# Patient Record
Sex: FEMALE | Race: WHITE | URBAN - METROPOLITAN AREA
[De-identification: names, ages, dates, MRNs, and addresses within clinical notes are randomized per-mention and may not be internally consistent; named-entity substitution may affect disease eponyms.]

---

## 2022-05-09 ENCOUNTER — HOSPITAL ENCOUNTER (OUTPATIENT)
Age: 22
Setting detail: SPECIMEN
Discharge: HOME | End: 2022-05-09

## 2022-05-09 ENCOUNTER — OFFICE VISIT (OUTPATIENT)
Dept: OBGYN CLINIC | Age: 22
End: 2022-05-09
Payer: COMMERCIAL

## 2022-05-09 VITALS
WEIGHT: 164 LBS | SYSTOLIC BLOOD PRESSURE: 104 MMHG | BODY MASS INDEX: 24.86 KG/M2 | DIASTOLIC BLOOD PRESSURE: 69 MMHG | HEIGHT: 68 IN

## 2022-05-09 DIAGNOSIS — N94.10 DYSPAREUNIA IN FEMALE: ICD-10-CM

## 2022-05-09 DIAGNOSIS — N94.10 DYSPAREUNIA IN FEMALE: Primary | ICD-10-CM

## 2022-05-09 DIAGNOSIS — Z30.431 ENCOUNTER FOR ROUTINE CHECKING OF INTRAUTERINE CONTRACEPTIVE DEVICE (IUD): ICD-10-CM

## 2022-05-09 PROCEDURE — 99213 OFFICE O/P EST LOW 20 MIN: CPT | Performed by: NURSE PRACTITIONER

## 2022-05-09 RX ORDER — BUPROPION HYDROCHLORIDE 150 MG/1
150 TABLET ORAL PRN
COMMUNITY
Start: 2022-03-08

## 2022-05-09 RX ORDER — PROPRANOLOL HYDROCHLORIDE 10 MG/1
TABLET ORAL
COMMUNITY
Start: 2021-03-08

## 2022-05-09 RX ORDER — FLUCONAZOLE 150 MG/1
TABLET ORAL
COMMUNITY
Start: 2022-05-06 | End: 2022-05-25 | Stop reason: ALTCHOICE

## 2022-05-09 NOTE — PROGRESS NOTES
PROBLEM VISIT     Date of service: 2022    Nima Storm  Is a 24 y.o. female    PT's PCP is: No primary care provider on file. : 2000                                             Subjective:       No LMP recorded. (Menstrual status: Other - See Notes). OB History    Para Term  AB Living   0 0 0 0 0 0   SAB IAB Ectopic Molar Multiple Live Births   0 0 0 0 0 0        Social History     Tobacco Use   Smoking Status Current Every Day Smoker   Smokeless Tobacco Never Used        Social History     Substance and Sexual Activity   Alcohol Use Yes       Allergies: Cephalexin and Doxycycline      Current Outpatient Medications:     propranolol (INDERAL) 10 MG tablet, TAKE 1 TABLET BY MOUTH TWICE DAILY AS NEEDED FOR ANXIETY, Disp: , Rfl:     buPROPion (WELLBUTRIN XL) 150 MG extended release tablet, Take 150 mg by mouth as needed, Disp: , Rfl:     Levonorgestrel (KYLEENA) IUD 19.5 mg, 1 each by IntraUTERine route once, Disp: , Rfl:     fluconazole (DIFLUCAN) 150 MG tablet, TAKE 1 TABLET BY MOUTH 1 TIME FOR 1 DOSE. MAY REPEAT DOSE IN 72 HOURS AS NEEDED, Disp: , Rfl:     Social History     Substance and Sexual Activity   Sexual Activity Yes    Partners: Male    Birth control/protection: I.U.D. Gregg Magaña- 19     Chief Complaint   Patient presents with    Dyspareunia     C/O dyspareunia x 3-4 wks, mostly right sided pain. PE:  Vital Signs  Blood pressure 104/69, height 5' 8\" (1.727 m), weight 164 lb (74.4 kg). HPI: Patient presents today with complaints of pelvic pain R>L onset by intercourse; lingers for 15 minutes following SI. Describes pain as cramping and sharp. denies any pelvic pain outside of intercourse. Denies any change is partners. Denies any s/s of vaginal infection. Reports negative STD screening recently and declines repeat today. PT denies fever, chills, nausea and vomiting       Review of Systems   Constitutional: Negative. Gastrointestinal: Negative. Genitourinary: Positive for dyspareunia. Negative for dysuria, menstrual problem, pelvic pain, vaginal bleeding and vaginal discharge. Physical Exam  Constitutional:       Appearance: Normal appearance. HENT:      Head: Normocephalic. Pulmonary:      Effort: Pulmonary effort is normal.   Genitourinary:     General: Normal vulva. Vagina: No vaginal discharge, erythema or bleeding. Cervix: Normal.      Uterus: Not tender. Adnexa:         Right: No tenderness. Left: No tenderness. Comments: IUD strings in cervical os  Musculoskeletal:         General: Normal range of motion. Neurological:      General: No focal deficit present. Mental Status: She is alert. Psychiatric:         Mood and Affect: Mood normal.         Behavior: Behavior normal.         Chaperone: not present     Assessment and Plan          Diagnosis Orders   1. Dyspareunia in female  Vaginitis DNA Probe   2. Encounter for routine checking of intrauterine contraceptive device (IUD)               I am having Jarred Palmer maintain her propranolol, Levonorgestrel, fluconazole, and buPROPion. Return in about 1 week (around 5/16/2022) for gyn US. There are no Patient Instructions on file for this visit.     Time spent 20 minutes      GISELLE Crawford NP,5/15/2022 6:55 PM

## 2022-05-10 LAB
CANDIDA SPECIES, DNA PROBE: NEGATIVE
GARDNERELLA VAGINALIS, DNA PROBE: NEGATIVE
SOURCE: NORMAL
TRICHOMONAS VAGINALIS DNA: NEGATIVE

## 2022-05-15 ASSESSMENT — ENCOUNTER SYMPTOMS: GASTROINTESTINAL NEGATIVE: 1

## 2022-05-25 ENCOUNTER — HOSPITAL ENCOUNTER (OUTPATIENT)
Age: 22
Setting detail: SPECIMEN
Discharge: HOME | End: 2022-05-25

## 2022-05-25 ENCOUNTER — OFFICE VISIT (OUTPATIENT)
Dept: OBGYN CLINIC | Age: 22
End: 2022-05-25
Payer: COMMERCIAL

## 2022-05-25 VITALS — SYSTOLIC BLOOD PRESSURE: 119 MMHG | DIASTOLIC BLOOD PRESSURE: 79 MMHG | BODY MASS INDEX: 24.78 KG/M2 | WEIGHT: 163 LBS

## 2022-05-25 DIAGNOSIS — N89.8 VAGINAL DRYNESS: ICD-10-CM

## 2022-05-25 DIAGNOSIS — N94.10 DYSPAREUNIA, FEMALE: Primary | ICD-10-CM

## 2022-05-25 DIAGNOSIS — Z30.431 ENCOUNTER FOR ROUTINE CHECKING OF INTRAUTERINE CONTRACEPTIVE DEVICE (IUD): ICD-10-CM

## 2022-05-25 LAB
ESTRADIOL LEVEL: 47.6 PG/ML (ref 27–314)
TSH SERPL DL<=0.05 MIU/L-ACNC: 0.91 UIU/ML (ref 0.3–5)

## 2022-05-25 PROCEDURE — 99213 OFFICE O/P EST LOW 20 MIN: CPT | Performed by: NURSE PRACTITIONER

## 2022-05-25 RX ORDER — CIPROFLOXACIN AND DEXAMETHASONE 3; 1 MG/ML; MG/ML
4 SUSPENSION/ DROPS AURICULAR (OTIC) 2 TIMES DAILY
COMMUNITY
Start: 2022-05-16

## 2022-05-25 ASSESSMENT — ENCOUNTER SYMPTOMS: GASTROINTESTINAL NEGATIVE: 1

## 2022-05-25 NOTE — PROGRESS NOTES
PROBLEM VISIT     Date of service: 2022    Irwin Fleming  Is a 24 y.o. female    PT's PCP is: No primary care provider on file. : 2000                                             Subjective:       Patient's last menstrual period was 2022. OB History    Para Term  AB Living   0 0 0 0 0 0   SAB IAB Ectopic Molar Multiple Live Births   0 0 0 0 0 0        Social History     Tobacco Use   Smoking Status Current Every Day Smoker   Smokeless Tobacco Never Used        Social History     Substance and Sexual Activity   Alcohol Use Yes       Allergies: Cephalexin and Doxycycline      Current Outpatient Medications:     ciprofloxacin-dexamethasone (CIPRODEX) 0.3-0.1 % otic suspension, Place 4 drops in ear(s) 2 times daily, Disp: , Rfl:     propranolol (INDERAL) 10 MG tablet, TAKE 1 TABLET BY MOUTH TWICE DAILY AS NEEDED FOR ANXIETY, Disp: , Rfl:     Levonorgestrel (KYLEENA) IUD 19.5 mg, 1 each by IntraUTERine route once, Disp: , Rfl:     buPROPion (WELLBUTRIN XL) 150 MG extended release tablet, Take 150 mg by mouth as needed, Disp: , Rfl:     Social History     Substance and Sexual Activity   Sexual Activity Yes    Partners: Male    Birth control/protection: I.U.D. Luciano Figueredo- 19       Chief Complaint   Patient presents with    Follow-up     Follow up usn for dyspareunia. PE:  Vital Signs  Blood pressure 119/79, weight 163 lb (73.9 kg), last menstrual period 2022. HPI: Patient presents today following ultrasound for painful intercourse. Only position the discomfort is tolerable is when she is on top. Also mentions, she has been experiencing vaginal dryness; lubrication only lasts for several minutes. Patient is frustrated by onset of painful intercourse for no apparent reason. Reports same partner. After further discussion she admits to being content with not having sex; once the acts starts she gets in the mood.  Reports that she is typically reaching climax during foreplay in order to become turned on so then when penetrative sex follows vagina is dry and painful. PT denies fever, chills, nausea and vomiting       Review of Systems   Constitutional: Negative. Gastrointestinal: Negative. Genitourinary: Positive for dyspareunia. Negative for dysuria, frequency, pelvic pain, vaginal bleeding and vaginal discharge. Physical Exam  Constitutional:       Appearance: Normal appearance. HENT:      Head: Normocephalic. Pulmonary:      Effort: Pulmonary effort is normal.   Musculoskeletal:         General: Normal range of motion. Neurological:      General: No focal deficit present. Mental Status: She is alert. Psychiatric:         Mood and Affect: Mood normal.         Behavior: Behavior normal.     UTERUS MEASURES 7 X 3.6 X 2.8 CM  ENDO = 4.8 MM  IUD VISUALIZED IN CORRECT POSITION WITH BOTH ARMS DEPLOYED.  BILATERAL OVARIES WNL    Assessment and Plan          Diagnosis Orders   1. Dyspareunia, female     2. Vaginal dryness  TSH With Reflex Ft4    Estradiol   3. Encounter for routine checking of intrauterine contraceptive device (IUD)         ultrasound reviewed- normal findings, IUD in correct location without any evidence of ovarian cysts. Patient is frustrated by onset of painful intercourse for no apparent reason. Discussed increased foreplay with changes to position and typical routine if there is one. Recommendations made     Patient is stressed with upcoming move, finishing college and studying for [employer]. Discussed how this can impact sexual desire. Offered referral to pelvic floor therapy, declines at this time. Will consider if there is no improvement. Questions if IUD is the cause, she has had current IUD since 2019 without issues until last 3-4 weeks. Offered removal and non hormonal options. Will assess estrogen level and she will consider options. I have discontinued Celeste Goodrich's fluconazole.  I am also having her maintain her propranolol, Levonorgestrel, buPROPion, and ciprofloxacin-dexamethasone. Return if symptoms worsen or fail to improve. There are no Patient Instructions on file for this visit.     Time spent 20 minutes      GISELLE Mejia NP,5/25/2022 3:44 PM